# Patient Record
Sex: MALE | Race: WHITE | NOT HISPANIC OR LATINO | Employment: OTHER | URBAN - NONMETROPOLITAN AREA
[De-identification: names, ages, dates, MRNs, and addresses within clinical notes are randomized per-mention and may not be internally consistent; named-entity substitution may affect disease eponyms.]

---

## 2020-10-06 ENCOUNTER — OFFICE VISIT CONVERTED (OUTPATIENT)
Dept: FAMILY MEDICINE CLINIC | Age: 45
End: 2020-10-06
Attending: FAMILY MEDICINE

## 2020-10-07 ENCOUNTER — HOSPITAL ENCOUNTER (OUTPATIENT)
Dept: OTHER | Facility: HOSPITAL | Age: 45
Discharge: HOME | End: 2020-10-07
Attending: FAMILY MEDICINE

## 2020-10-07 LAB
25(OH)D3 SERPL-MCNC: 30.9 NG/ML (ref 30–100)
ALBUMIN SERPL-MCNC: 4.6 G/DL (ref 3.5–5)
ALBUMIN/GLOB SERPL: 2.1 {RATIO} (ref 1.4–2.6)
ALP SERPL-CCNC: 71 U/L (ref 53–128)
ALT SERPL-CCNC: 12 U/L (ref 10–40)
ANION GAP SERPL CALC-SCNC: 14 MMOL/L (ref 8–19)
AST SERPL-CCNC: 17 U/L (ref 15–50)
BASOPHILS # BLD AUTO: 0.04 10*3/UL (ref 0–0.2)
BASOPHILS NFR BLD AUTO: 0.7 % (ref 0–3)
BILIRUB SERPL-MCNC: 0.51 MG/DL (ref 0.2–1.3)
BUN SERPL-MCNC: 16 MG/DL (ref 5–25)
BUN/CREAT SERPL: 16 {RATIO} (ref 6–20)
CALCIUM SERPL-MCNC: 9 MG/DL (ref 8.7–10.4)
CHLORIDE SERPL-SCNC: 104 MMOL/L (ref 99–111)
CONV ABS IMM GRAN: 0.01 10*3/UL (ref 0–0.2)
CONV CO2: 25 MMOL/L (ref 22–32)
CONV IMMATURE GRAN: 0.2 % (ref 0–1.8)
CONV TOTAL PROTEIN: 6.8 G/DL (ref 6.3–8.2)
CREAT UR-MCNC: 0.98 MG/DL (ref 0.7–1.2)
DEPRECATED RDW RBC AUTO: 42.9 FL (ref 35.1–43.9)
EOSINOPHIL # BLD AUTO: 0.09 10*3/UL (ref 0–0.7)
EOSINOPHIL # BLD AUTO: 1.6 % (ref 0–7)
ERYTHROCYTE [DISTWIDTH] IN BLOOD BY AUTOMATED COUNT: 13.1 % (ref 11.6–14.4)
GFR SERPLBLD BASED ON 1.73 SQ M-ARVRAT: >60 ML/MIN/{1.73_M2}
GLOBULIN UR ELPH-MCNC: 2.2 G/DL (ref 2–3.5)
GLUCOSE SERPL-MCNC: 99 MG/DL (ref 70–99)
HCT VFR BLD AUTO: 43.5 % (ref 42–52)
HGB BLD-MCNC: 14.3 G/DL (ref 14–18)
LYMPHOCYTES # BLD AUTO: 1.79 10*3/UL (ref 1–5)
LYMPHOCYTES NFR BLD AUTO: 32.7 % (ref 20–45)
MCH RBC QN AUTO: 29.1 PG (ref 27–31)
MCHC RBC AUTO-ENTMCNC: 32.9 G/DL (ref 33–37)
MCV RBC AUTO: 88.6 FL (ref 80–96)
MONOCYTES # BLD AUTO: 0.37 10*3/UL (ref 0.2–1.2)
MONOCYTES NFR BLD AUTO: 6.8 % (ref 3–10)
NEUTROPHILS # BLD AUTO: 3.17 10*3/UL (ref 2–8)
NEUTROPHILS NFR BLD AUTO: 58 % (ref 30–85)
NRBC CBCN: 0 % (ref 0–0.7)
OSMOLALITY SERPL CALC.SUM OF ELEC: 289 MOSM/KG (ref 273–304)
PLATELET # BLD AUTO: 215 10*3/UL (ref 130–400)
PMV BLD AUTO: 9.2 FL (ref 9.4–12.4)
POTASSIUM SERPL-SCNC: 4.2 MMOL/L (ref 3.5–5.3)
RBC # BLD AUTO: 4.91 10*6/UL (ref 4.7–6.1)
SODIUM SERPL-SCNC: 139 MMOL/L (ref 135–147)
TSH SERPL-ACNC: 1.75 M[IU]/L (ref 0.27–4.2)
VIT B12 SERPL-MCNC: 566 PG/ML (ref 211–911)
WBC # BLD AUTO: 5.47 10*3/UL (ref 4.8–10.8)

## 2020-10-08 LAB
ASO AB SERPL-ACNC: 258 [IU]/ML (ref 0–200)
CONV RHEUMATOID FACTOR IGM: <10 [IU]/ML (ref 0–14)
CRP SERPL-MCNC: 1.1 MG/L (ref 0–5)
DSDNA AB SER-ACNC: NEGATIVE [IU]/ML
ENA AB SER IA-ACNC: NEGATIVE {RATIO}
ERYTHROCYTE [SEDIMENTATION RATE] IN BLOOD: 3 MM/H (ref 0–20)
PHOSPHATE SERPL-MCNC: 3.1 MG/DL (ref 2.4–4.5)
URATE SERPL-MCNC: 5.7 MG/DL (ref 3.5–8.5)

## 2020-11-02 ENCOUNTER — HOSPITAL ENCOUNTER (OUTPATIENT)
Dept: OTHER | Facility: HOSPITAL | Age: 45
Discharge: HOME | End: 2020-11-02
Attending: FAMILY MEDICINE

## 2020-11-02 LAB
ASO AB SERPL-ACNC: 245 [IU]/ML (ref 0–200)
TESTOST SERPL-MCNC: 335 NG/DL (ref 249–836)

## 2021-05-07 ENCOUNTER — OFFICE VISIT CONVERTED (OUTPATIENT)
Dept: FAMILY MEDICINE CLINIC | Age: 46
End: 2021-05-07
Attending: FAMILY MEDICINE

## 2021-05-17 ENCOUNTER — OFFICE VISIT CONVERTED (OUTPATIENT)
Dept: GASTROENTEROLOGY | Facility: CLINIC | Age: 46
End: 2021-05-17
Attending: NURSE PRACTITIONER
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
## 2021-05-22 ENCOUNTER — TRANSCRIBE ORDERS (OUTPATIENT)
Dept: LAB | Facility: HOSPITAL | Age: 46
End: 2021-05-22

## 2021-05-22 DIAGNOSIS — Z01.818 PRE-OP TESTING: Primary | ICD-10-CM

## 2021-06-05 NOTE — H&P
"   History and Physical      Patient Name: Curt Mo   Patient ID: [id number]   Sex: Male   YOB: 1975    Primary Care Provider: Rene Hernandez MD   Referring Provider: Regulo Fair MD    Visit Date: May 17, 2021    Provider: EMMANUEL Guerin   Location: [facility] [place]   Location Address: [address] [place], [place] [address]an [facility]  [address] [place] [address]  [id number]          Chief Complaint  · Rectal Bleeding      History Of Present Illness  The patient is a 45 year old /White male who presents on referral from Regulo Fair MD for a gastroenterology evaluation.      Mr. Mo presents today with complaints of hemorrhoids and rectal bleeding. States he has been dealing with bleeding and rectal pain for several years. The bleeding is bright red and comes and goes, normally last approx. 3-4 days.  [facility] and states he has been given multiple suppositories and rectal creams however he feels they are not treating the whole problem. Normally has a bowel movement 3-4x daily. Stool is mainly formed however does have an occasional \"hard\" stool. Denies any melena, abdominal pain, or family hx of colon cancer. Requesting a colonoscopy.     He also reports occasional heartburn however feels it has lessened over the years. History of a Nissen fundoplication and states he is supposed to have endoscopies every 5 years with the last one being almost a decade ago. Occasional nausea without vomiting. Denies dysphagia, change in appetite, or weight loss. Takes ibuprofen approx. once weekly for joint pain.     PMH: Daniel 20+ years ago; Dumping syndrome      [facility] [place]       Past Medical History  Arthritis; Asthma; Dumping syndrome         Past Surgical History  Nissen Fundoplication         Medication List  Plenvu 140-9-5.2 gram oral powder in packet, sequential       Allergies Reconciled  Social History  Alcohol (Light); " "Tobacco (Never)         Review of Systems  · Constitutional  o Denies  o : chills, fever  · Eyes  o Denies  o : blurred vision, changes in vision  · Cardiovascular  o Denies  o : chest pain, syncope  · Respiratory  o Denies  o : shortness of breath, dry cough  · Gastrointestinal  o Admits  o : See HPI  · Genitourinary  o Denies  o : dysuria, blood in urine  · Integument  o Denies  o : rash, new skin lesions  · Neurologic  o Denies  o : altered mental status, tingling or numbness  · Musculoskeletal  o Admits  o : joint pain  o Denies  o : limitation of motion  · Endocrine  o Denies  o : weight gain, weight loss  · Psychiatric  o Denies  o : anxiety, depression      Vitals  Date Time BP Position Site L\R Cuff Size HR RR TEMP (F) WT  HT  BMI kg/m2 BSA m2 O2 Sat FR L/min FiO2        05/17/2021 02:22 /73 Sitting    79 - R   245lbs 0oz 6'  3\" 30.62 2.43             Physical Examination  · Constitutional  o Appearance  o : well developed, well-nourished, in no acute distress  · Eyes  o Vision  o :   § Visual Fields  § : eyes move symmetrical in all directions  o Sclerae  o : anicteric  o Pupils and Irises  o : pupils equal and symmetrical  · Neck  o Inspection/Palpation  o : supple  · Respiratory  o Respiratory Effort  o : breathing unlabored  o Inspection of Chest  o : normal appearance, no retractions  o Auscultation of Lungs  o : clear to auscultation bilaterally  · Cardiovascular  o Heart  o :   § Auscultation of Heart  § : no murmurs, gallops or rubs  · Gastrointestinal  o Abdominal Examination  o : soft, nontender to palpation, with normal active bowel sounds, no appreciable hepatosplenomegaly  o Digital Rectal Exam  o : deferred  · Lymphatic  o Neck  o : no palpable lymphadenopathy  · Skin and Subcutaneous Tissue  o General Inspection  o : without focal lesions; turgor is normal  · Psychiatric  o General  o : Alert and oriented x3  o Mood and Affect  o : Mood and affect are appropriate to " circumstances          Assessment  · Heartburn     787.1/R12  · Nausea alone     787.02/R11.0  · Rectal bleeding     569.3/K62.5      Plan  · Orders  o Consent for Colonoscopy with Possible Biopsy - Possible risks/complications, benefits, and alternatives to surgical or invasive procedure have been explained to patient and/or legal guardian. -Patient has been evaluated and can tolerate anesthesia and/or sedation. Risks, benefits, and alternatives to anesthesia and sedation have been explained to patient and/or legal guardian. (59846) - - 05/17/2021  o Consent for Esophagogastroduodenoscopy (EGD) - Possible risks/complications, benefits, and alternatives to surgical or invasive procedure have been explained to patient and/or legal guardian. - Patient has been evaluated and can tolerate anesthesia and/or sedation. Risks, benefits, and alternatives to anesthesia and sedation have been explained to patient and/or legal guardian. ([id number]) - - 05/17/2021  · Medications  o Medications have been Reconciled  · Instructions  o Handouts provided: Pre-procedure instructions including date and time and location of procedure.  o PLAN: Proceed with procedure. Patient understands risks and benefits and is willing to proceed. Understands the risks include, but are not limited to, bleeding and/or perforation.  o Information given on current diagnoses.  o Briefly discussed with patient hemorrhoidectomies. Educated him that if he would like to proceed with this after scopes we do refer to general surgery and he states he understands.  o Electronically Identified Patient Education Materials Provided Electronically  · Disposition  o Follow up after procedure            Electronically Signed by: EMMANUEL Guerin -Author on May 17, 2021 02:49:53 PM

## 2021-06-07 ENCOUNTER — TELEPHONE (OUTPATIENT)
Dept: FAMILY MEDICINE CLINIC | Age: 46
End: 2021-06-07

## 2021-06-07 NOTE — TELEPHONE ENCOUNTER
Call to pt and inf, pt states someone did call him with an appt for something, verified it was from  gastro.

## 2021-06-07 NOTE — TELEPHONE ENCOUNTER
PATIENT STATED HE WAS SUPPOSED TO RECEIVE A LETTER IN THE MAIL REGARDING HIS APPOINTMENT WITH DR. DANG FOR SCOPING, BUT HAS NOT RECEIVED IT. HE IS WONDERING IF HE CAN BE ADVISED ON APPOINTMENT DETAILS, ALONG WITH LOCATION.     PLEASE CALL AND ADVISE: [phone]

## 2021-07-02 VITALS
HEIGHT: 76 IN | WEIGHT: 244.4 LBS | TEMPERATURE: 97.4 F | HEART RATE: 84 BPM | BODY MASS INDEX: 29.76 KG/M2 | DIASTOLIC BLOOD PRESSURE: 75 MMHG | SYSTOLIC BLOOD PRESSURE: 135 MMHG

## 2021-07-02 VITALS
HEART RATE: 74 BPM | SYSTOLIC BLOOD PRESSURE: 134 MMHG | TEMPERATURE: 96.1 F | WEIGHT: 249.4 LBS | DIASTOLIC BLOOD PRESSURE: 85 MMHG | BODY MASS INDEX: 30.37 KG/M2 | HEIGHT: 76 IN

## 2021-07-15 VITALS
SYSTOLIC BLOOD PRESSURE: 123 MMHG | BODY MASS INDEX: 30.46 KG/M2 | HEIGHT: 75 IN | WEIGHT: 245 LBS | HEART RATE: 79 BPM | DIASTOLIC BLOOD PRESSURE: 73 MMHG

## 2021-10-01 ENCOUNTER — TELEPHONE (OUTPATIENT)
Dept: FAMILY MEDICINE CLINIC | Age: 46
End: 2021-10-01

## 2021-10-01 NOTE — TELEPHONE ENCOUNTER
Hub staff attempted to follow warm transfer process and was unsuccessful     Caller: Curt Mo    Relationship to patient: Self    Best call back number: [phone]    Patient is needing: PATIENT STATED AS HE IS IN OUR SYSTEM AND SEEN DR CASTILLO AS PCP HE IS REQUESTING TO SEE DR LITTLE AS A NEW PATIENT FOR A PHYSICAL

## 2021-10-12 NOTE — TELEPHONE ENCOUNTER
CALLED PT TO SCHEDULE WITH A NP, PT DECLINED & STATED THAT HE HAS ALREADY SCHEDULED WITH A DIFFERENT OFFICE.

## 2021-10-12 NOTE — TELEPHONE ENCOUNTER
Having reviewed Curt's chart, it does not appear that he has a significantly complex medical history.  Therefore, given the large number of other patients the physicians are trying to absorb from Dr. Hernandez, I am going to recommend that Curt establish with one of our excellent nurse practitioners instead.  Thanks, MAURICE

## 2024-07-18 ENCOUNTER — OFFICE VISIT (OUTPATIENT)
Dept: PODIATRY | Facility: CLINIC | Age: 49
End: 2024-07-18
Payer: OTHER GOVERNMENT

## 2024-07-18 VITALS
HEART RATE: 71 BPM | DIASTOLIC BLOOD PRESSURE: 99 MMHG | WEIGHT: 248 LBS | BODY MASS INDEX: 30.84 KG/M2 | OXYGEN SATURATION: 98 % | HEIGHT: 75 IN | SYSTOLIC BLOOD PRESSURE: 144 MMHG

## 2024-07-18 DIAGNOSIS — L60.0 INGROWN TOENAIL: Primary | ICD-10-CM

## 2024-07-18 DIAGNOSIS — M79.671 RIGHT FOOT PAIN: ICD-10-CM

## 2024-07-18 NOTE — PROGRESS NOTES
[facility] - PODIATRY    Today's Date: 07/18/24    Patient Name: Curt Mo  MRN: [id number]  CSN: [id number]  PCP: Provider, No Known,   Referring Provider: Reema Schwartz APRN    SUBJECTIVE     Chief Complaint   Patient presents with    Right Foot - Establish Care, Ingrown Toenail     Right great toenail is painful. 5th toe is painful     HPI: Curt Mo, a 48 y.o.male, presents to clinic.    Patient with right great toe pain.  Patient has had his ingrown toenails removed before.  He also has some thickened discoloration on his lesser toes.  Past Medical History:   Diagnosis Date    Arthritis     Asthma     Dumping syndrome      Past Surgical History:   Procedure Laterality Date    NISSEN FUNDOPLICATION       History reviewed. No pertinent family history.  Social History     Socioeconomic History    Marital status:    Tobacco Use    Smoking status: Never     Passive exposure: Never   Vaping Use    Vaping status: Never Used   Substance and Sexual Activity    Alcohol use: Yes     Comment: LIGHT     Drug use: Defer    Sexual activity: Defer     Not on File  No current outpatient medications on file.     No current facility-administered medications for this visit.     Review of Systems   Constitutional: Negative.    Skin:         Painful Right in-grown toenail   All other systems reviewed and are negative.      OBJECTIVE     Vitals:    07/18/24 0836   BP: 144/99   Pulse: 71   SpO2: 98%       WBC   Date Value Ref Range Status   10/07/2020 5.47 4.80 - 10.80 10*3/uL Final     RBC   Date Value Ref Range Status   10/07/2020 4.91 4.70 - 6.10 10*6/uL Final     Hemoglobin   Date Value Ref Range Status   10/07/2020 14.3 14.0 - 18.0 g/dL Final     Hematocrit   Date Value Ref Range Status   10/07/2020 43.5 42.0 - 52.0 % Final     MCV   Date Value Ref Range Status   10/07/2020 88.6 80.0 - 96.0 fL Final     MCH   Date Value Ref Range Status   10/07/2020 29.1 27.0 - 31.0 pg Final     MCHC   Date Value  Ref Range Status   10/07/2020 32.9 (L) 33.0 - 37.0 Final     RDW   Date Value Ref Range Status   10/07/2020 13.1 11.6 - 14.4 % Final     RDW-SD   Date Value Ref Range Status   10/07/2020 42.9 35.1 - 43.9 fL Final     MPV   Date Value Ref Range Status   10/07/2020 9.2 (L) 9.4 - 12.4 fL Final     Platelets   Date Value Ref Range Status   10/07/2020 215 130 - 400 10*3/uL Final     Neutrophil Rel %   Date Value Ref Range Status   10/07/2020 58.0 30.0 - 85.0 % Final     Lymphocyte Rel %   Date Value Ref Range Status   10/07/2020 32.7 20.0 - 45.0 % Final     Monocyte Rel %   Date Value Ref Range Status   10/07/2020 6.8 3.0 - 10.0 % Final     Eosinophil Rel %   Date Value Ref Range Status   10/07/2020 1.6 0.0 - 7.0 % Final     Basophil Rel %   Date Value Ref Range Status   10/07/2020 0.7 0.0 - 3.0 % Final     Neutrophils Absolute   Date Value Ref Range Status   10/07/2020 3.17 2.00 - 8.00 10*3/uL Final     Lymphocytes Absolute   Date Value Ref Range Status   10/07/2020 1.79 1.00 - 5.00 10*3/uL Final     Monocytes Absolute   Date Value Ref Range Status   10/07/2020 0.37 0.20 - 1.20 10*3/uL Final     Eosinophils Absolute   Date Value Ref Range Status   10/07/2020 0.09 0.00 - 0.70 10*3/uL Final     Basophils Absolute   Date Value Ref Range Status   10/07/2020 0.04 0.00 - 0.20 10*3/uL Final     NRBC   Date Value Ref Range Status   10/07/2020 0.00 0.00 - 0.70 % Final         Lab Results   Component Value Date    GLUCOSE 99 10/07/2020    BUN 16 10/07/2020    CREATININE 0.98 10/07/2020    BCR 16 10/07/2020    K 4.2 10/07/2020    CO2 25 10/07/2020    CALCIUM 9.0 10/07/2020    ALBUMIN 4.6 10/07/2020    LABIL2 2.1 10/07/2020    AST 17 10/07/2020    ALT 12 10/07/2020       Patient seen in no apparent distress.      PHYSICAL EXAM:     Foot/Ankle Exam    GENERAL  Appearance:  appears stated age  Orientation:  AAOx3  Affect:  appropriate  Gait:  unimpaired  Assistance:  independent  Right shoe gear: casual shoe  Left shoe gear: casual  shoe    VASCULAR     Right Foot Vascularity   Normal vascular exam    Dorsalis pedis:  2+  Posterior tibial:  2+  Skin temperature:  warm  Edema grading:  None  CFT:  < 3 seconds  Pedal hair growth:  Present  Varicosities:  none     Left Foot Vascularity   Normal vascular exam    Dorsalis pedis:  2+  Posterior tibial:  2+  Skin temperature:  warm  Edema grading:  None  CFT:  < 3 seconds  Pedal hair growth:  Present  Varicosities:  none     NEUROLOGIC     Right Foot Neurologic   Normal sensation    Light touch sensation: normal  Vibratory sensation: normal  Hot/Cold sensation: normal     Left Foot Neurologic   Normal sensation    Light touch sensation: normal  Vibratory sensation: normal  Hot/Cold sensation:  normal    MUSCULOSKELETAL     Right Foot Musculoskeletal   Tenderness:  toe 1 tenderness      MUSCLE STRENGTH     Right Foot Muscle Strength   Foot dorsiflexion:  4  Foot plantar flexion:  4  Foot inversion:  4  Foot eversion:  4     Left Foot Muscle Strength   Foot dorsiflexion:  4  Foot plantar flexion:  4  Foot inversion:  4  Foot eversion:  4    RANGE OF MOTION     Right Foot Range of Motion   Foot and ankle ROM within normal limits       Left Foot Range of Motion   Foot and ankle ROM within normal limits      DERMATOLOGIC      Right Foot Dermatologic   Skin  Right foot skin is intact.   Nails  1.  Positive for ingrown toenail and paronychia. (Lateral nail border)  Nails comment:  Right 1st lateral nail border     Left Foot Dermatologic   Skin  Left foot skin is intact.   Nails comment:  Toenails 1, 2, 3, 4, and 5      RADIOLOGY:        No results found.    ASSESSMENT/PLAN     Diagnoses and all orders for this visit:    1. Ingrown toenail (Primary)    2. Right foot pain        Comprehensive lower extremity examination and evaluation was performed.    Phenol and Alcohol Chemical Matrixectomy Procedure - This procedure is indicated for onychocryptosis of the right lateral nail border(s). Indications, risks and  benefits and alternative treatments have been discussed with this patient who has agreed to this procedure. The area was sterilely prepped with a povidone-iodine solution. The affected area was locally anesthetized with 3 ml, of 0.5% Marcaine plain. The offending nail plate was completely excised.  Next 3 applications of 89% phenol were applied to the matrix area x 30 seconds followed by irrigation with copious amounts of isopropyl alcohol.  A sterile dressing was applied. The patient tolerated the procedure well.     Discussed findings and treatment plan including risks, benefits, and treatment options with patient in detail. Patient agreed with treatment plan.    Medications and allergies reviewed.  Reviewed available lab values along with other pertinent labs.  These were discussed with the patient.    An After Visit Summary was printed and given to the patient at discharge, including (if requested) any available informative/educational handouts regarding diagnosis, treatment, or medications. All questions were answered to patient/family satisfaction. Should symptoms fail to improve or worsen they agree to call or return to clinic or to go to the Emergency Department. Discussed the importance of following up with any needed screening tests/labs/specialist appointments and any requested follow-up recommended by me today. Importance of maintaining follow-up discussed and patient accepts that missed appointments can delay diagnosis and potentially lead to worsening of conditions.    Return in about 1 month (around 8/18/2024)., or sooner if acute issues arise.    This document has been electronically signed by Jose Burgess DPM on July 18, 2024 10:32 EDT